# Patient Record
Sex: MALE | Race: WHITE | NOT HISPANIC OR LATINO | Employment: FULL TIME | URBAN - METROPOLITAN AREA
[De-identification: names, ages, dates, MRNs, and addresses within clinical notes are randomized per-mention and may not be internally consistent; named-entity substitution may affect disease eponyms.]

---

## 2023-02-28 ENCOUNTER — OFFICE VISIT (OUTPATIENT)
Dept: URGENT CARE | Facility: CLINIC | Age: 23
End: 2023-02-28
Payer: COMMERCIAL

## 2023-02-28 VITALS
TEMPERATURE: 98 F | BODY MASS INDEX: 21.92 KG/M2 | OXYGEN SATURATION: 98 % | SYSTOLIC BLOOD PRESSURE: 146 MMHG | DIASTOLIC BLOOD PRESSURE: 83 MMHG | HEART RATE: 64 BPM | WEIGHT: 180 LBS | HEIGHT: 76 IN | RESPIRATION RATE: 20 BRPM

## 2023-02-28 DIAGNOSIS — H53.8 BLURRED VISION: ICD-10-CM

## 2023-02-28 DIAGNOSIS — S09.90XA INJURY OF HEAD, INITIAL ENCOUNTER: ICD-10-CM

## 2023-02-28 DIAGNOSIS — R51.9 ACUTE NONINTRACTABLE HEADACHE, UNSPECIFIED HEADACHE TYPE: Primary | ICD-10-CM

## 2023-02-28 PROCEDURE — 3079F DIAST BP 80-89 MM HG: CPT | Mod: CPTII,S$GLB,,

## 2023-02-28 PROCEDURE — 3077F SYST BP >= 140 MM HG: CPT | Mod: CPTII,S$GLB,,

## 2023-02-28 PROCEDURE — 99214 OFFICE O/P EST MOD 30 MIN: CPT | Mod: S$GLB,,,

## 2023-02-28 PROCEDURE — 3077F PR MOST RECENT SYSTOLIC BLOOD PRESSURE >= 140 MM HG: ICD-10-PCS | Mod: CPTII,S$GLB,,

## 2023-02-28 PROCEDURE — 3079F PR MOST RECENT DIASTOLIC BLOOD PRESSURE 80-89 MM HG: ICD-10-PCS | Mod: CPTII,S$GLB,,

## 2023-02-28 PROCEDURE — 3008F BODY MASS INDEX DOCD: CPT | Mod: CPTII,S$GLB,,

## 2023-02-28 PROCEDURE — 3008F PR BODY MASS INDEX (BMI) DOCUMENTED: ICD-10-PCS | Mod: CPTII,S$GLB,,

## 2023-02-28 PROCEDURE — 99214 PR OFFICE/OUTPT VISIT, EST, LEVL IV, 30-39 MIN: ICD-10-PCS | Mod: S$GLB,,,

## 2023-02-28 NOTE — PROGRESS NOTES
"Subjective:       Patient ID: Chinedu Shafer is a 22 y.o. male.    Vitals:  height is 6' 4" (1.93 m) and weight is 81.6 kg (180 lb). His temperature is 98.4 °F (36.9 °C). His blood pressure is 146/83 (abnormal) and his pulse is 64. His respiration is 20 and oxygen saturation is 98%.     Chief Complaint: Head Injury    Pt hit his head on a cabinet at work this AM    Head Injury   The incident occurred 6 to 12 hours ago. There was no loss of consciousness. Associated symptoms include blurred vision and headaches.     Constitution: Negative for activity change, appetite change, chills, sweating, fever and unexpected weight change.   HENT:  Negative for ear pain, postnasal drip, sinus pain, sinus pressure and sore throat.    Cardiovascular:  Negative for chest pain.   Eyes:  Positive for blurred vision.   Respiratory:  Negative for chest tightness, cough and shortness of breath.    Gastrointestinal:  Negative for abdominal pain.   Neurological:  Positive for headaches. Negative for dizziness, history of vertigo and altered mental status.   Psychiatric/Behavioral:  Negative for altered mental status.      Objective:      Physical Exam   Constitutional: He is oriented to person, place, and time.  Non-toxic appearance. He does not appear ill. No distress.   HENT:   Head: Normocephalic.   Mouth/Throat: Mucous membranes are moist. No oropharyngeal exudate or posterior oropharyngeal erythema.   Eyes: Conjunctivae are normal. Pupils are equal, round, and reactive to light. Extraocular movement intact   Neck: Neck supple. No crepitus. No neck rigidity present. No decreased range of motion present. No pain with movement present.   Cardiovascular: Normal rate, normal heart sounds and normal pulses.   Pulmonary/Chest: Effort normal and breath sounds normal. No respiratory distress. He has no wheezes. He has no rhonchi.   Musculoskeletal:      Cervical back: He exhibits no tenderness and no bony tenderness.      Thoracic back: " Normal.      Lumbar back: Normal.   Neurological: no focal deficit. He is alert and oriented to person, place, and time. He has normal sensation. He displays facial symmetry and no dysarthria. He has a normal Finger-Nose-Finger Test. Coordination: Heel to shin test normal. Gait and coordination normal. Gait normal. GCS eye subscore is 4. GCS verbal subscore is 5. GCS motor subscore is 6.   Skin: Skin is warm, dry and not diaphoretic. Capillary refill takes 2 to 3 seconds.   Psychiatric: His behavior is normal. Mood normal.       Assessment:       1. Acute nonintractable headache, unspecified headache type    2. Injury of head, initial encounter    3. Blurred vision          Plan:         Acute nonintractable headache, unspecified headache type    Injury of head, initial encounter    Blurred vision         Pt presents after having a wooden cabinet fall on his head earlier today that was about a foot above his head, states it weighed estimated 50lbs, denies LOC, confusion, dizziness, nausea, vomiting or memory loss, neuro exam intact, no wound, knot or laceration on scalp, cranial nerves intact, pupils PERLLA, pt oriented x 4, reports he only notices blurred vision when he turns is head real fast then his eyes refocus and are clear seconds later, currently not having any visual changes or blurred vision, reports dull headache 1/10 that has improved since earlier, pt instructed he may have a mild concussion and only way to rule out any life threatening is to go to the ER, pt not going to the ER at this time, pt denies worst headache of his life, pt will have close fu with his pcp in next 24 hours, ER precautions given.

## 2023-02-28 NOTE — PATIENT INSTRUCTIONS
It appears you may have a mild concussion, to rule out anything life threatening recommend you go to the emergency room for further evaluation and treatment.     If you do not go to the ER, take tylenol and motrin as needed for pain.    Follow up with your PCP in the next 24-48 hours to re-evaluate symptoms.     If you develop worsening headache, blurred vision, lightheadedness, nausea, memory loss, vomiting, dizziness or worsening of symptoms go to the emergency room.

## 2024-01-06 ENCOUNTER — HOSPITAL ENCOUNTER (EMERGENCY)
Facility: HOSPITAL | Age: 24
Discharge: HOME | End: 2024-01-06
Attending: STUDENT IN AN ORGANIZED HEALTH CARE EDUCATION/TRAINING PROGRAM
Payer: COMMERCIAL

## 2024-01-06 VITALS
TEMPERATURE: 99 F | DIASTOLIC BLOOD PRESSURE: 69 MMHG | WEIGHT: 180 LBS | HEART RATE: 77 BPM | OXYGEN SATURATION: 99 % | SYSTOLIC BLOOD PRESSURE: 139 MMHG | RESPIRATION RATE: 23 BRPM | BODY MASS INDEX: 21.91 KG/M2

## 2024-01-06 DIAGNOSIS — R07.9 CHEST PAIN, UNSPECIFIED TYPE: Primary | ICD-10-CM

## 2024-01-06 DIAGNOSIS — R07.9 CHEST PAIN: ICD-10-CM

## 2024-01-06 LAB
ALBUMIN SERPL BCP-MCNC: 4.6 G/DL (ref 3.5–5.2)
ALP SERPL-CCNC: 71 U/L (ref 55–135)
ALT SERPL W/O P-5'-P-CCNC: 10 U/L (ref 10–44)
ANION GAP SERPL CALC-SCNC: 6 MMOL/L (ref 8–16)
AST SERPL-CCNC: 14 U/L (ref 10–40)
BASOPHILS # BLD AUTO: 0.06 K/UL (ref 0–0.2)
BASOPHILS NFR BLD: 0.5 % (ref 0–1.9)
BILIRUB SERPL-MCNC: 0.5 MG/DL (ref 0.1–1)
BNP SERPL-MCNC: 27 PG/ML (ref 0–99)
BUN SERPL-MCNC: 14 MG/DL (ref 6–20)
CALCIUM SERPL-MCNC: 9.8 MG/DL (ref 8.7–10.5)
CHLORIDE SERPL-SCNC: 104 MMOL/L (ref 95–110)
CO2 SERPL-SCNC: 28 MMOL/L (ref 23–29)
CREAT SERPL-MCNC: 0.8 MG/DL (ref 0.5–1.4)
D DIMER PPP IA.FEU-MCNC: 0.22 MG/L FEU (ref 0–0.49)
DIFFERENTIAL METHOD BLD: ABNORMAL
EOSINOPHIL # BLD AUTO: 0.1 K/UL (ref 0–0.5)
EOSINOPHIL NFR BLD: 0.8 % (ref 0–8)
ERYTHROCYTE [DISTWIDTH] IN BLOOD BY AUTOMATED COUNT: 12.1 % (ref 11.5–14.5)
EST. GFR  (NO RACE VARIABLE): >60 ML/MIN/1.73 M^2
GLUCOSE SERPL-MCNC: 99 MG/DL (ref 70–110)
HCT VFR BLD AUTO: 42.8 % (ref 40–54)
HGB BLD-MCNC: 14.9 G/DL (ref 14–18)
IMM GRANULOCYTES # BLD AUTO: 0.04 K/UL (ref 0–0.04)
IMM GRANULOCYTES NFR BLD AUTO: 0.4 % (ref 0–0.5)
INR PPP: 1 (ref 0.8–1.2)
LYMPHOCYTES # BLD AUTO: 1.1 K/UL (ref 1–4.8)
LYMPHOCYTES NFR BLD: 9.7 % (ref 18–48)
MAGNESIUM SERPL-MCNC: 1.7 MG/DL (ref 1.6–2.6)
MCH RBC QN AUTO: 31.7 PG (ref 27–31)
MCHC RBC AUTO-ENTMCNC: 34.8 G/DL (ref 32–36)
MCV RBC AUTO: 91 FL (ref 82–98)
MONOCYTES # BLD AUTO: 0.7 K/UL (ref 0.3–1)
MONOCYTES NFR BLD: 6.3 % (ref 4–15)
NEUTROPHILS # BLD AUTO: 9.1 K/UL (ref 1.8–7.7)
NEUTROPHILS NFR BLD: 82.3 % (ref 38–73)
NRBC BLD-RTO: 0 /100 WBC
PLATELET # BLD AUTO: 241 K/UL (ref 150–450)
PMV BLD AUTO: 9.2 FL (ref 9.2–12.9)
POTASSIUM SERPL-SCNC: 4.1 MMOL/L (ref 3.5–5.1)
PROT SERPL-MCNC: 7.5 G/DL (ref 6–8.4)
PROTHROMBIN TIME: 11.1 SEC (ref 9–12.5)
RBC # BLD AUTO: 4.7 M/UL (ref 4.6–6.2)
SODIUM SERPL-SCNC: 138 MMOL/L (ref 136–145)
TROPONIN I SERPL HS-MCNC: <2.3 PG/ML (ref 0–14.9)
TROPONIN I SERPL HS-MCNC: <2.3 PG/ML (ref 0–14.9)
WBC # BLD AUTO: 11.1 K/UL (ref 3.9–12.7)

## 2024-01-06 PROCEDURE — 85379 FIBRIN DEGRADATION QUANT: CPT | Performed by: STUDENT IN AN ORGANIZED HEALTH CARE EDUCATION/TRAINING PROGRAM

## 2024-01-06 PROCEDURE — 85025 COMPLETE CBC W/AUTO DIFF WBC: CPT | Performed by: EMERGENCY MEDICINE

## 2024-01-06 PROCEDURE — 83735 ASSAY OF MAGNESIUM: CPT | Performed by: EMERGENCY MEDICINE

## 2024-01-06 PROCEDURE — 83880 ASSAY OF NATRIURETIC PEPTIDE: CPT | Performed by: EMERGENCY MEDICINE

## 2024-01-06 PROCEDURE — 85610 PROTHROMBIN TIME: CPT | Performed by: EMERGENCY MEDICINE

## 2024-01-06 PROCEDURE — 84484 ASSAY OF TROPONIN QUANT: CPT | Mod: 91 | Performed by: EMERGENCY MEDICINE

## 2024-01-06 PROCEDURE — 93010 ELECTROCARDIOGRAM REPORT: CPT | Mod: ,,, | Performed by: INTERNAL MEDICINE

## 2024-01-06 PROCEDURE — 99285 EMERGENCY DEPT VISIT HI MDM: CPT | Mod: 25

## 2024-01-06 PROCEDURE — 80053 COMPREHEN METABOLIC PANEL: CPT | Performed by: EMERGENCY MEDICINE

## 2024-01-06 PROCEDURE — 93005 ELECTROCARDIOGRAM TRACING: CPT | Performed by: INTERNAL MEDICINE

## 2024-01-06 NOTE — ED PROVIDER NOTES
"Encounter Date: 1/6/2024       History     Chief Complaint   Patient presents with    Chest Pain     Chest pain located left sided in the rib area. Discomfort laying on the side. "Feels heartbeat in throat when taking deep breaths"     HPI    Patient is a 23-year-old male with no significant past medical history presenting with 1 day of acute left-sided chest pain.  Patient states that over the last week he has felt a swelling sensation to the left side of his chest.  This morning he woke up due to chest pain that is left-sided.  Pain is sharp, seems to radiate into his throat and his left shoulder.  Pain was associated with clammy hands and nausea without vomiting.  State that it is worse on deep inspiration.  No family history of heart disease or early cardiac death.  Patient does not smoke cigarettes.     Review of patient's allergies indicates:  No Known Allergies  No past medical history on file.  No past surgical history on file.  No family history on file.     Review of Systems   Constitutional:  Negative for activity change, appetite change and fever.   Respiratory:  Negative for shortness of breath.    Cardiovascular:  Positive for chest pain. Negative for leg swelling.   Gastrointestinal:  Negative for abdominal pain, nausea and vomiting.       Physical Exam     Initial Vitals [01/06/24 1043]   BP Pulse Resp Temp SpO2   135/76 86 17 98.5 °F (36.9 °C) 99 %      MAP       --         Physical Exam    Constitutional: He appears well-developed.   Cardiovascular:  Normal rate.           No murmur heard.  Pulses equal bilaterally   Pulmonary/Chest: Breath sounds normal. No respiratory distress. He has no wheezes. He has no rhonchi. He has no rales.   Abdominal: Abdomen is soft. He exhibits no distension. There is no abdominal tenderness.   Musculoskeletal:         General: Normal range of motion.     Neurological: He is alert and oriented to person, place, and time.   Skin: Skin is warm.         ED Course "   Procedures  Labs Reviewed   CBC W/ AUTO DIFFERENTIAL - Abnormal; Notable for the following components:       Result Value    MCH 31.7 (*)     Gran # (ANC) 9.1 (*)     Gran % 82.3 (*)     Lymph % 9.7 (*)     All other components within normal limits   COMPREHENSIVE METABOLIC PANEL - Abnormal; Notable for the following components:    Anion Gap 6 (*)     All other components within normal limits   B-TYPE NATRIURETIC PEPTIDE   MAGNESIUM   TROPONIN I HIGH SENSITIVITY   TROPONIN I HIGH SENSITIVITY   PROTIME-INR   D DIMER, QUANTITATIVE     EKG Readings: (Independently Interpreted)   EKG with a rate of 74, normal axis, normal intervals, patient with diffuse ST elevation without any reciprocal ST depression.  ST morphology is generally upsloping.  Findings consistent with early repolarization.       Imaging Results              X-Ray Chest AP Portable (Final result)  Result time 01/06/24 12:21:36      Final result by Kenneth Bess MD (01/06/24 12:21:36)                   Narrative:    XR CHEST 1 VIEW    CLINICAL HISTORY:  23 years Male Chest pain    COMPARISON: None    FINDINGS: Cardiac silhouette size is within normal limits. No confluent airspace disease. No large pleural effusion or pneumothorax. No acute osseous abnormality.    IMPRESSION: No acute pulmonary process.    Electronically signed by:  Kenneth Bess MD  01/06/2024 12:21 PM CST Workstation: [id number]                                     Medications - No data to display  Medical Decision Making  23-year-old male with no significant past medical history presenting with left-sided chest pain that woke him from sleep this morning.  Patient also reports a pleuritic component to his pain.  Vital signs here are all within normal limits.  On exam patient has marfanoid habitus but no acute abnormality.  Pulses equal bilaterally.  Lungs clear with no increased work of breathing.  No reproducible chest pain.  EKG with signs of early repolarization.  Troponin  negative x2.  D-dimer is negative making PE or dissection less likely.  Patient has not had any recurrent episodes of pain during our appeared of observation.  At this time I feel patient is safe to go home with outpatient workup and follow up as needed.  Referral placed to Cardiology and return precautions were discussed.    Isaías Hall MD  Emergency Medicine      Amount and/or Complexity of Data Reviewed  Labs:  Decision-making details documented in ED Course.               ED Course as of 01/06/24 1546   Sat Jan 06, 2024   1540 D-Dimer: 0.22 [KH]   1542 Troponin I High Sensitivity: <2.3 [KH]      ED Course User Index  [KH] Isaías Hall MD                           Clinical Impression:  Final diagnoses:  [R07.9] Chest pain, unspecified type (Primary)          ED Disposition Condition    Discharge Stable          ED Prescriptions    None       Follow-up Information       Follow up With Specialties Details Why Contact Info Additional Information    [facility] - Emergency Dept Emergency Medicine  As needed, If symptoms worsen including persistent symptoms or worsening symptoms, any other concerns [address] [facility] [id number]  [phone] 1st floor    Juan Arnett MD Interventional Cardiology, Cardiology Schedule an appointment as soon as possible for a visit  For further workup and management of chest pain [address] [facility] [address]  [facility]  [facility] [address]  [phone]                Isaías Hall MD  01/06/24 154

## 2024-01-10 ENCOUNTER — OFFICE VISIT (OUTPATIENT)
Dept: FAMILY MEDICINE | Facility: CLINIC | Age: 24
End: 2024-01-10
Payer: COMMERCIAL

## 2024-01-10 VITALS
OXYGEN SATURATION: 98 % | TEMPERATURE: 98 F | SYSTOLIC BLOOD PRESSURE: 112 MMHG | HEART RATE: 72 BPM | WEIGHT: 179.69 LBS | HEIGHT: 76 IN | DIASTOLIC BLOOD PRESSURE: 78 MMHG | BODY MASS INDEX: 21.88 KG/M2

## 2024-01-10 DIAGNOSIS — R07.89 COSTOCHONDRAL CHEST PAIN: Primary | ICD-10-CM

## 2024-01-10 PROCEDURE — 1160F RVW MEDS BY RX/DR IN RCRD: CPT | Mod: CPTII,S$GLB,, | Performed by: STUDENT IN AN ORGANIZED HEALTH CARE EDUCATION/TRAINING PROGRAM

## 2024-01-10 PROCEDURE — 1159F MED LIST DOCD IN RCRD: CPT | Mod: CPTII,S$GLB,, | Performed by: STUDENT IN AN ORGANIZED HEALTH CARE EDUCATION/TRAINING PROGRAM

## 2024-01-10 PROCEDURE — 99203 OFFICE O/P NEW LOW 30 MIN: CPT | Mod: S$GLB,,, | Performed by: STUDENT IN AN ORGANIZED HEALTH CARE EDUCATION/TRAINING PROGRAM

## 2024-01-10 PROCEDURE — 3008F BODY MASS INDEX DOCD: CPT | Mod: CPTII,S$GLB,, | Performed by: STUDENT IN AN ORGANIZED HEALTH CARE EDUCATION/TRAINING PROGRAM

## 2024-01-10 PROCEDURE — 99999 PR PBB SHADOW E&M-EST. PATIENT-LVL IV: CPT | Mod: PBBFAC,,, | Performed by: STUDENT IN AN ORGANIZED HEALTH CARE EDUCATION/TRAINING PROGRAM

## 2024-01-10 PROCEDURE — 3074F SYST BP LT 130 MM HG: CPT | Mod: CPTII,S$GLB,, | Performed by: STUDENT IN AN ORGANIZED HEALTH CARE EDUCATION/TRAINING PROGRAM

## 2024-01-10 PROCEDURE — 3078F DIAST BP <80 MM HG: CPT | Mod: CPTII,S$GLB,, | Performed by: STUDENT IN AN ORGANIZED HEALTH CARE EDUCATION/TRAINING PROGRAM

## 2024-01-10 RX ORDER — NAPROXEN 500 MG/1
500 TABLET ORAL 2 TIMES DAILY
Qty: 28 TABLET | Refills: 0 | Status: SHIPPED | OUTPATIENT
Start: 2024-01-10 | End: 2024-01-24

## 2024-01-10 NOTE — PROGRESS NOTES
"[facility] Note    Subjective:  Chief Complaint:   Chief Complaint   Patient presents with    Chest Pain       History of Present Illness:  Chinedu is a pleasant intelligent patient who is here for ER follow up     Recent ER visit 1/6/23 for chest wall pain    "1 day of acute left-sided chest pain.  Patient states that over the last week he has felt a swelling sensation to the left side of his chest.  This morning he woke up due to chest pain that is left-sided.  Pain is sharp, seems to radiate into his throat and his left shoulder.  Pain was associated with clammy hands and nausea without vomiting.  State that it is worse on deep inspiration. "  EKG with signs of early repolarization.  Troponin negative x2.  D-dimer is negative making PE or dissection less likely.   CXR unremarkable     Discomfort x 1 week   Feels the pain in left shoulder and rib  Worse in the evening , during the day more of a pressure or discomfort   Denies f/c, sob, diaphoresis   Taking Tylenol     Allergies:  Review of patient's allergies indicates:  No Known Allergies    Home Medications:  No current outpatient medications on file prior to visit.     No current facility-administered medications on file prior to visit.       History reviewed. No pertinent past medical history.  History reviewed. No pertinent surgical history.  History reviewed. No pertinent family history.  Social History     Tobacco Use    Smoking status: Former     Current packs/day: 0.25     Types: Cigarettes    Smokeless tobacco: Never            The patient's past medical history, surgical history, social history, family history, allergies and medications have been reviewed.    Review of Systems     10 point review of systems was conducted and only the pertinent positives and pertinent negatives are noted above in the HPI section.    Physical Examination  General appearance: alert, cooperative, no distress  HEENT: normocephalic, atraumatic, PERRLA   Neck: " "trachea midline,  no neck stiffness  Lungs: clear to auscultation, no wheezes, rales or rhonchi, symmetric air entry  Heart: normal rate, regular rhythm, normal S1, S2, no murmurs, rubs, clicks or gallops  Abdomen: soft, nontender, nondistended, no rigidity, rebound, or guarding.   Skin: No rashes or abnormal skin lesions, no apparent jaundice or bruising  Extremities: Full ROM of all extremities, peripheral pulses normal, no unilateral leg swelling or calf tenderness  Neurological:alert, oriented, normal speech, no new focal findings or movement disorder noted from baseline      BP Readings from Last 3 Encounters:   01/10/24 112/78   01/06/24 139/69   02/28/23 (!) 146/83     Wt Readings from Last 3 Encounters:   01/10/24 81.5 kg (179 lb 10.8 oz)   01/06/24 81.6 kg (180 lb)   02/28/23 81.6 kg (180 lb)     /78 (BP Location: Left arm, Patient Position: Sitting, BP Method: Medium (Manual))   Pulse 72   Temp 98 °F (36.7 °C) (Oral)   Ht 6' 4" (1.93 m)   Wt 81.5 kg (179 lb 10.8 oz)   SpO2 98%   BMI 21.87 kg/m²       274}  Laboratory: I have reviewed old labs below:       274}    Lab Results   Component Value Date    WBC 11.10 01/06/2024    HGB 14.9 01/06/2024    HCT 42.8 01/06/2024    MCV 91 01/06/2024     01/06/2024     01/06/2024    K 4.1 01/06/2024     01/06/2024    CALCIUM 9.8 01/06/2024    CO2 28 01/06/2024    GLU 99 01/06/2024    BUN 14 01/06/2024    CREATININE 0.8 01/06/2024    EGFRNORACEVR >60.0 01/06/2024    ANIONGAP 6 (L) 01/06/2024    PROT 7.5 01/06/2024    ALBUMIN 4.6 01/06/2024    BILITOT 0.5 01/06/2024    ALKPHOS 71 01/06/2024    ALT 10 01/06/2024    AST 14 01/06/2024    INR 1.0 01/06/2024      Lab reviewed by me: Particular labs of significance that I will monitor, workup, or treat to improve are mentioned below in diagnostic impression remarks.    Imaging/EKG: I have reviewed the pertinent results and my findings are noted in remarks.     274}    CC:   Chief Complaint "   Patient presents with    Chest Pain           274}    Assessment/Plan  Chinedu Shafer is a 23 y.o. male who presents to clinic with:  1. Costochondral chest pain          274}  Diagnostic Impression Remarks       23 y.o. male who presents to clinic today for chest pain ER follow up     This is the extent of this pleasant patient's concerns at this present time. He did not feel chest pain upon exertion, dyspnea, nausea, vomiting, diaphoresis, or syncope. No pleuritic chest pain, unilateral leg swelling, calf tenderness, or calf pain. Negative for unintentional weight loss night sweats, hematuria, and fevers. Chinedu will return to clinic in a few months for further workup and reassessment or sooner as needed. He was instructed to call the clinic or go to the emergency department or urgent care immediately if his symptoms do not improve, worsens, or if any new symptoms develop. As we discussed that symptoms could worsen over the next 24 hours he was advised that if any increased swelling, pain, or numbness arise to go immediately to the ED. Patient knows to call any time if an emergency arises. Shared decision making occurred and he verbalized understanding in agreement with this plan. I discussed imaging findings, diagnosis, possibilities, treatment options, medications, risks, and benefits. He had many questions regarding the options and long-term effects. All questions were answered. He expressed understanding after counseling regarding the diagnosis and recommendations. He was capable and demonstrated competence with understanding of these options. Shared decision making was performed resulting in him choosing the current treatment plan. Patient handout was given with instructions and recommendations. Advised the patient that if they become pregnant to alert us immediately to assess for medication changes. Having a healthy weight can decrease the risk of 13 cancers and is an important goal. I also discussed  the importance of close follow up to discuss labs, change or modify his medications if needed, monitor side effects, and further evaluation of medical problems.     Additional workup planned: see labs ordered below.    See below for labs and meds ordered with associated diagnosis      1. Costochondral chest pain  - naproxen (NAPROSYN) 500 MG tablet; Take 1 tablet (500 mg total) by mouth 2 (two) times daily. for 14 days  Dispense: 28 tablet; Refill: 0  Reassurance  Follow with Cardiology as scheduled    Return precautions provided   RTC PRN     Elisha Kumar MD, [facility]   Family Medicine Physician  01/10/2024

## 2024-01-30 ENCOUNTER — OFFICE VISIT (OUTPATIENT)
Dept: CARDIOLOGY | Facility: CLINIC | Age: 24
End: 2024-01-30
Payer: COMMERCIAL

## 2024-01-30 VITALS
WEIGHT: 182 LBS | HEART RATE: 73 BPM | SYSTOLIC BLOOD PRESSURE: 124 MMHG | BODY MASS INDEX: 22.16 KG/M2 | DIASTOLIC BLOOD PRESSURE: 80 MMHG | RESPIRATION RATE: 16 BRPM | HEIGHT: 76 IN | OXYGEN SATURATION: 100 %

## 2024-01-30 DIAGNOSIS — R94.31 NONSPECIFIC ABNORMAL ELECTROCARDIOGRAM (ECG) (EKG): Primary | ICD-10-CM

## 2024-01-30 DIAGNOSIS — R07.9 CHEST PAIN, UNSPECIFIED TYPE: ICD-10-CM

## 2024-01-30 PROCEDURE — 1159F MED LIST DOCD IN RCRD: CPT | Mod: CPTII,S$GLB,, | Performed by: INTERNAL MEDICINE

## 2024-01-30 PROCEDURE — 1160F RVW MEDS BY RX/DR IN RCRD: CPT | Mod: CPTII,S$GLB,, | Performed by: INTERNAL MEDICINE

## 2024-01-30 PROCEDURE — 3079F DIAST BP 80-89 MM HG: CPT | Mod: CPTII,S$GLB,, | Performed by: INTERNAL MEDICINE

## 2024-01-30 PROCEDURE — 93000 ELECTROCARDIOGRAM COMPLETE: CPT | Mod: S$GLB,,, | Performed by: INTERNAL MEDICINE

## 2024-01-30 PROCEDURE — 99999 PR PBB SHADOW E&M-EST. PATIENT-LVL IV: CPT | Mod: PBBFAC,,, | Performed by: INTERNAL MEDICINE

## 2024-01-30 PROCEDURE — 99204 OFFICE O/P NEW MOD 45 MIN: CPT | Mod: S$GLB,,, | Performed by: INTERNAL MEDICINE

## 2024-01-30 PROCEDURE — 3008F BODY MASS INDEX DOCD: CPT | Mod: CPTII,S$GLB,, | Performed by: INTERNAL MEDICINE

## 2024-01-30 PROCEDURE — 3074F SYST BP LT 130 MM HG: CPT | Mod: CPTII,S$GLB,, | Performed by: INTERNAL MEDICINE

## 2024-01-30 RX ORDER — PANTOPRAZOLE SODIUM 40 MG/1
40 TABLET, DELAYED RELEASE ORAL DAILY
Qty: 30 TABLET | Refills: 11 | Status: SHIPPED | OUTPATIENT
Start: 2024-01-30 | End: 2025-01-29

## 2024-01-30 NOTE — ASSESSMENT & PLAN NOTE
Patient had atypical chest pains valve was such chest wall related.  Appears to be fairly stable at this time recommend to obtain a symptom limited exercise stress test to rule out any ischemic component to it.  I will advise him to add Protonix to his regimen for dyspeptic symptoms management

## 2024-01-30 NOTE — PROGRESS NOTES
Subjective:    Patient ID:  Chinedu Shafer is a 23 y.o. male patient here for evaluation Establish Care (Was seen in the ED for chest pain, woke him up in the middle of the night with pain radiating into the arm)      History of Present Illness:     83-year-old gentleman who has been relatively good health apparently had developed chest discomfort while he was asleep on January 6th became more intense and lasted over an hour he managed to go back to sleep and when he woke up he continued to have soreness in his chest and he finally sought evaluation for the same in the emergency room.  He had negative troponins and EKG did not have any acute changes he was told that it could be related to arrhythmia and he was subsequently discharged home and instructed to have a follow-up evaluation with Cardiology.  Chest pains subsided spontaneously and resolved over next 2-3 days.  He denies having associated cough or congestion no fevers or chills no vomiting he did have some diaphoresis associated with that symptoms.  And some nausea but vomiting during the episode.  The day before he has some soreness along the left ribcage and he could not lay comfortably in any position.  He uses recreational drug marijuana regular basis.  Ethanol socially  Gainfully employed full-time  No childhood disorders.  Family history  Negative for premature heart disease arterial hypertension    Allergies none        Review of patient's allergies indicates:  No Known Allergies    History reviewed. No pertinent past medical history.  History reviewed. No pertinent surgical history.  Social History     Tobacco Use    Smoking status: Former     Current packs/day: 0.25     Types: Cigarettes    Smokeless tobacco: Never        Review of Systems   As noted in HPI in addition     Constitutional: Negative for chills, fatigue and fever.   Eyes: No double vision, No blurred vision  Neuro: No headaches, No dizziness  Respiratory: Negative for cough,  "shortness of breath and wheezing.    Cardiovascular:  Chest wall pain described above now subsided   Gastrointestinal: Negative for abdominal pain, No melena, diarrhea, nausea and vomiting.   Genitourinary: Negative for dysuria and frequency, Negative for hematuria  Skin: Negative for bruising, Negative for edema or discoloration noted.   Endocrine: Negative for polyphagia, Negative for heat intolerance, Negative for cold intolerance  Psychiatric: Negative for depression, Negative for anxiety, Negative for memory loss  Musculoskeletal: Negative for neck pain, Negative for muscle weakness, Negative for back pain          Objective        Vitals:    01/30/24 1553   BP: 124/80   Pulse: 73   Resp: 16       LIPIDS - LAST 2   No results found for: "CHOL", "HDL", "LDLCALC", "TRIG", "CHOLHDL"    CBC - LAST 2  Lab Results   Component Value Date    WBC 11.10 01/06/2024    RBC 4.70 01/06/2024    HGB 14.9 01/06/2024    HCT 42.8 01/06/2024    MCV 91 01/06/2024    MCH 31.7 (H) 01/06/2024    MCHC 34.8 01/06/2024    RDW 12.1 01/06/2024     01/06/2024    MPV 9.2 01/06/2024    GRAN 9.1 (H) 01/06/2024    GRAN 82.3 (H) 01/06/2024    LYMPH 1.1 01/06/2024    LYMPH 9.7 (L) 01/06/2024    MONO 0.7 01/06/2024    MONO 6.3 01/06/2024    BASO 0.06 01/06/2024    NRBC 0 01/06/2024       CHEMISTRY & LIVER FUNCTION - LAST 2  Lab Results   Component Value Date     01/06/2024    K 4.1 01/06/2024     01/06/2024    CO2 28 01/06/2024    ANIONGAP 6 (L) 01/06/2024    BUN 14 01/06/2024    CREATININE 0.8 01/06/2024    GLU 99 01/06/2024    CALCIUM 9.8 01/06/2024    MG 1.7 01/06/2024    ALBUMIN 4.6 01/06/2024    PROT 7.5 01/06/2024    ALKPHOS 71 01/06/2024    ALT 10 01/06/2024    AST 14 01/06/2024    BILITOT 0.5 01/06/2024        CARDIAC PROFILE - LAST 2  Lab Results   Component Value Date    BNP 27 01/06/2024    TROPONINIHS <2.3 01/06/2024    TROPONINIHS <2.3 01/06/2024        COAGULATION - LAST 2  Lab Results   Component Value Date    INR " "1.0 01/06/2024       ENDOCRINE & PSA - LAST 2  No results found for: "HGBA1C", "MICROALBUR", "TSH", "PROCAL", "PSA"     ECHOCARDIOGRAM RESULTS  No results found for this or any previous visit.      CURRENT/PREVIOUS VISIT EKG  Results for orders placed or performed during the hospital encounter of 01/06/24   EKG 12-lead    Collection Time: 01/06/24 10:55 AM    Narrative    Test Reason : R07.9,    Vent. Rate : 074 BPM     Atrial Rate : 074 BPM     P-R Int : 158 ms          QRS Dur : 102 ms      QT Int : 350 ms       P-R-T Axes : 054 094 053 degrees     QTc Int : 388 ms    Sinus rhythm with marked sinus arrythmia  Rightward axis  Incomplete right bundle branch block  Minimal voltage criteria for LVH, may be normal variant  ST elevation, consider early repolarization, pericarditis, or injury  Abnormal ECG  No previous ECGs available  Confirmed by Tomi Salter MD ([id number]) on 1/7/2024 3:14:34 PM    Referred By: [email or web address]   SELF           Confirmed By:Tomi Salter MD     No valid procedures specified.   No results found for this or any previous visit.    No valid procedures specified.    01/30/2024 EKG shows sinus bradycardia with sinus arrhythmia right axis deviation and incomplete right bundle branch block morphology      PREVIOUS STRESS TEST              PREVIOUS ANGIOGRAM        PHYSICAL EXAM    GENERAL: well built, well nourished, well-developed in no apparent distress alert and oriented.   HEENT: Normocephalic. Pupils normal and conjunctivae normal.  Mucous membranes normal, no cyanosis or icterus, trachea central,no pallor or icterus is noted..   NECK: No JVD. No bruit..   THYROID: Thyroid not enlarged. No nodules present..   CARDIAC: Regular rate and rhythm. S1 is normal.S2 is normal.No gallops, clicks or murmurs noted at this time.  CHEST ANATOMY: normal.   LUNGS: Clear to auscultation. No wheezing or rhonchi..   ABDOMEN: Soft no masses or organomegaly.  No abdomen pulsations or bruits.  Normal bowel " sounds. No pulsations and no masses felt, No guarding or rebound.   EXTREMITIES: No cyanosis, clubbing or edema noted at this time., no calf tenderness bilaterally.   PERIPHERAL VASCULAR SYSTEM: Good palpable distal pulses.   CENTRAL NERVOUS SYSTEM: No focal motor or sensory deficits noted.   SKIN: Skin without lesions, moist, well perfused.   MUSCLE STRENGTH & TONE: No noteable weakness, atrophy or abnormal movement.     I HAVE REVIEWED :    The vital signs, nurses notes, and all the pertinent radiology and labs.        No current outpatient medications     01/30/2024 EKG shows sinus bradycardia with sinus arrhythmia right axis deviation incomplete right bundle branch block morphology nonspecific ST T wave changes    Assessment & Plan     Chest pain  Patient had atypical chest pains valve was such chest wall related.  Appears to be fairly stable at this time recommend to obtain a symptom limited exercise stress test to rule out any ischemic component to it.  I will advise him to add Protonix to his regimen for dyspeptic symptoms management    Nonspecific abnormal electrocardiogram (ECG) (EKG)  As above obtain a symptom limited exercise stress test and also an echocardiogram for LV function assessment chamber size and valvular morphology.    I have advised him to stop using the recreational drug as can be home full-time especially with presence of recurrent chest pains      No follow-ups on file.

## 2024-01-30 NOTE — ASSESSMENT & PLAN NOTE
As above obtain a symptom limited exercise stress test and also an echocardiogram for LV function assessment chamber size and valvular morphology.

## 2024-02-29 LAB
OHS QRS DURATION: 100 MS
OHS QTC CALCULATION: 382 MS